# Patient Record
Sex: MALE | Race: OTHER | NOT HISPANIC OR LATINO | URBAN - METROPOLITAN AREA
[De-identification: names, ages, dates, MRNs, and addresses within clinical notes are randomized per-mention and may not be internally consistent; named-entity substitution may affect disease eponyms.]

---

## 2020-10-02 ENCOUNTER — EMERGENCY (EMERGENCY)
Facility: HOSPITAL | Age: 35
LOS: 1 days | Discharge: HOME | End: 2020-10-02
Attending: HOSPITALIST | Admitting: EMERGENCY MEDICINE
Payer: MEDICAID

## 2020-10-02 VITALS
OXYGEN SATURATION: 100 % | TEMPERATURE: 99 F | RESPIRATION RATE: 16 BRPM | DIASTOLIC BLOOD PRESSURE: 82 MMHG | HEART RATE: 78 BPM | SYSTOLIC BLOOD PRESSURE: 124 MMHG

## 2020-10-02 LAB
ALBUMIN SERPL ELPH-MCNC: 4.3 G/DL — SIGNIFICANT CHANGE UP (ref 3.3–5)
ALP SERPL-CCNC: 95 U/L — SIGNIFICANT CHANGE UP (ref 40–120)
ALT FLD-CCNC: 15 U/L — SIGNIFICANT CHANGE UP (ref 4–41)
ANION GAP SERPL CALC-SCNC: 12 MMO/L — SIGNIFICANT CHANGE UP (ref 7–14)
AST SERPL-CCNC: 21 U/L — SIGNIFICANT CHANGE UP (ref 4–40)
BASE EXCESS BLDV CALC-SCNC: 4 MMOL/L — SIGNIFICANT CHANGE UP
BASOPHILS # BLD AUTO: 0.02 K/UL — SIGNIFICANT CHANGE UP (ref 0–0.2)
BASOPHILS NFR BLD AUTO: 0.2 % — SIGNIFICANT CHANGE UP (ref 0–2)
BILIRUB SERPL-MCNC: 0.4 MG/DL — SIGNIFICANT CHANGE UP (ref 0.2–1.2)
BLOOD GAS VENOUS - CREATININE: 1.03 MG/DL — SIGNIFICANT CHANGE UP (ref 0.5–1.3)
BUN SERPL-MCNC: 11 MG/DL — SIGNIFICANT CHANGE UP (ref 7–23)
CALCIUM SERPL-MCNC: 9.3 MG/DL — SIGNIFICANT CHANGE UP (ref 8.4–10.5)
CHLORIDE BLDV-SCNC: 104 MMOL/L — SIGNIFICANT CHANGE UP (ref 96–108)
CHLORIDE SERPL-SCNC: 101 MMOL/L — SIGNIFICANT CHANGE UP (ref 98–107)
CO2 SERPL-SCNC: 26 MMOL/L — SIGNIFICANT CHANGE UP (ref 22–31)
CREAT SERPL-MCNC: 0.88 MG/DL — SIGNIFICANT CHANGE UP (ref 0.5–1.3)
EOSINOPHIL # BLD AUTO: 0.28 K/UL — SIGNIFICANT CHANGE UP (ref 0–0.5)
EOSINOPHIL NFR BLD AUTO: 2.7 % — SIGNIFICANT CHANGE UP (ref 0–6)
GAS PNL BLDV: 141 MMOL/L — SIGNIFICANT CHANGE UP (ref 136–146)
GLUCOSE BLDV-MCNC: 96 MG/DL — SIGNIFICANT CHANGE UP (ref 70–99)
GLUCOSE SERPL-MCNC: 96 MG/DL — SIGNIFICANT CHANGE UP (ref 70–99)
HCO3 BLDV-SCNC: 25 MMOL/L — SIGNIFICANT CHANGE UP (ref 20–27)
HCT VFR BLD CALC: 46.1 % — SIGNIFICANT CHANGE UP (ref 39–50)
HCT VFR BLDV CALC: 49.8 % — SIGNIFICANT CHANGE UP (ref 39–51)
HGB BLD-MCNC: 15.5 G/DL — SIGNIFICANT CHANGE UP (ref 13–17)
HGB BLDV-MCNC: 16.3 G/DL — SIGNIFICANT CHANGE UP (ref 13–17)
IMM GRANULOCYTES NFR BLD AUTO: 0.2 % — SIGNIFICANT CHANGE UP (ref 0–1.5)
LACTATE BLDV-MCNC: 0.8 MMOL/L — SIGNIFICANT CHANGE UP (ref 0.5–2)
LYMPHOCYTES # BLD AUTO: 2.14 K/UL — SIGNIFICANT CHANGE UP (ref 1–3.3)
LYMPHOCYTES # BLD AUTO: 20.6 % — SIGNIFICANT CHANGE UP (ref 13–44)
MCHC RBC-ENTMCNC: 28.6 PG — SIGNIFICANT CHANGE UP (ref 27–34)
MCHC RBC-ENTMCNC: 33.6 % — SIGNIFICANT CHANGE UP (ref 32–36)
MCV RBC AUTO: 85.1 FL — SIGNIFICANT CHANGE UP (ref 80–100)
MONOCYTES # BLD AUTO: 1.04 K/UL — HIGH (ref 0–0.9)
MONOCYTES NFR BLD AUTO: 10 % — SIGNIFICANT CHANGE UP (ref 2–14)
NEUTROPHILS # BLD AUTO: 6.89 K/UL — SIGNIFICANT CHANGE UP (ref 1.8–7.4)
NEUTROPHILS NFR BLD AUTO: 66.3 % — SIGNIFICANT CHANGE UP (ref 43–77)
NRBC # FLD: 0 K/UL — SIGNIFICANT CHANGE UP (ref 0–0)
PCO2 BLDV: 53 MMHG — HIGH (ref 41–51)
PH BLDV: 7.36 PH — SIGNIFICANT CHANGE UP (ref 7.32–7.43)
PLATELET # BLD AUTO: 225 K/UL — SIGNIFICANT CHANGE UP (ref 150–400)
PMV BLD: 11.1 FL — SIGNIFICANT CHANGE UP (ref 7–13)
PO2 BLDV: 25 MMHG — LOW (ref 35–40)
POTASSIUM BLDV-SCNC: 3.7 MMOL/L — SIGNIFICANT CHANGE UP (ref 3.4–4.5)
POTASSIUM SERPL-MCNC: 3.8 MMOL/L — SIGNIFICANT CHANGE UP (ref 3.5–5.3)
POTASSIUM SERPL-SCNC: 3.8 MMOL/L — SIGNIFICANT CHANGE UP (ref 3.5–5.3)
PROT SERPL-MCNC: 6.9 G/DL — SIGNIFICANT CHANGE UP (ref 6–8.3)
RBC # BLD: 5.42 M/UL — SIGNIFICANT CHANGE UP (ref 4.2–5.8)
RBC # FLD: 12.3 % — SIGNIFICANT CHANGE UP (ref 10.3–14.5)
SAO2 % BLDV: 41.7 % — LOW (ref 60–85)
SODIUM SERPL-SCNC: 139 MMOL/L — SIGNIFICANT CHANGE UP (ref 135–145)
WBC # BLD: 10.39 K/UL — SIGNIFICANT CHANGE UP (ref 3.8–10.5)
WBC # FLD AUTO: 10.39 K/UL — SIGNIFICANT CHANGE UP (ref 3.8–10.5)

## 2020-10-02 PROCEDURE — 99284 EMERGENCY DEPT VISIT MOD MDM: CPT

## 2020-10-02 RX ORDER — SODIUM CHLORIDE 9 MG/ML
2000 INJECTION INTRAMUSCULAR; INTRAVENOUS; SUBCUTANEOUS ONCE
Refills: 0 | Status: COMPLETED | OUTPATIENT
Start: 2020-10-02 | End: 2020-10-02

## 2020-10-02 RX ADMIN — SODIUM CHLORIDE 2000 MILLILITER(S): 9 INJECTION INTRAMUSCULAR; INTRAVENOUS; SUBCUTANEOUS at 21:38

## 2020-10-02 RX ADMIN — Medication 20 MILLIGRAM(S): at 23:48

## 2020-10-02 RX ADMIN — SODIUM CHLORIDE 2000 MILLILITER(S): 9 INJECTION INTRAMUSCULAR; INTRAVENOUS; SUBCUTANEOUS at 23:49

## 2020-10-02 NOTE — ED PROVIDER NOTE - PROGRESS NOTE DETAILS
TONIO: I was signed out this pt pending CT read of abd for abd pain. CT shows no intraacute pathology. Pt was reassessed and has minimal pain at this time 2/10 per pt. tolerating PO, hungry. possible infection but otherwise healthy pt. Rec to f/u with PMD and hydrate PO and pain meds PRN. Pt aware. Given a copy of all labs and imaging for his records.

## 2020-10-02 NOTE — ED PROVIDER NOTE - PHYSICAL EXAMINATION
CONSTITUTIONAL: alert and active in no apparent distress; thin habitus.  HEAD: head atraumatic; normal cephalic shape.  EYES: clear bilaterally; no conjunctivitis or scleral icterus.  NOSE: nasal mucosa clear; no nasal discharge or congestion.  OROPHARYNX: lips/mouth moist with normal mucosa.  CARDIAC: regular rate & rhythm; normal S1, S2; no murmurs, rubs or gallops.  RESPIRATORY: breath sounds clear to auscultation bilaterally; no distress present, no crackles, wheezes, rales, rhonchi, retractions, or tachypnea.  GASTROINTESTINAL: Abdomen mildly tender primarily periumbilical; non-distended; normoactive bowel sounds.  SKIN: skin warm, dry and intact; no evidence of rash.  NEURO: alert; interactive; no focal deficits.

## 2020-10-02 NOTE — ED ADULT NURSE NOTE - OBJECTIVE STATEMENT
pt amb to rm 15, A&Ox3, skin w/d/i, c/o abdm pain x 1 week associated w/ approx 12 episodes of diarrhea today occasionally w/ blood, denies blood thinner use, states usually abdm pain resolved w/ tums however did not resolve today, appears in NAD @ this time, MD Sales @ bedside for eval, SL placed, labs sent, report to G[facility] SYKES.  (fac)

## 2020-10-02 NOTE — ED PROVIDER NOTE - ATTENDING CONTRIBUTION TO CARE
35M with no PMhx p/w crampy abdominal pain and diarrhea for the past week. patient states he has been having numerous brown/watery loose stools per day with associated abdominal pain. today he tried drinking water and he ended up in the bathroom with more diarrhea. (+) anorexia. no fevers, travel, vomiting. did see 1 episode of blood today with most recent BM.     ***GEN - NAD; well appearing; A+O x3 ***HEAD - NC/AT ***EYES/NOSE - PERRL, EOMI, mucous membranes moist, no discharge ***THROAT: Oral cavity and pharynx normal. No inflammation, swelling, exudate, or lesions.  ***NECK: Neck supple, non-tender without lymphadenopathy, no masses, no thyromegaly.   ***PULMONARY - CTA b/l, symmetric breath sounds. ***CARDIAC -s1s2, RRR, no M,G,R  ***ABDOMEN - +BS, ND, NT, soft, no guarding, no rebound, no masses   ***BACK - no CVA tenderness, Normal  spine ***EXTREMITIES - symmetric pulses, 2+ dp, capillary refill < 2 seconds, no clubbing, no cyanosis, no edema ***SKIN - no rash or bruising   ***NEUROLOGIC - alert, CN 2-12 intact    MDM: 35M with diarrhea x1 week and abdominal pain. w/u for UC/Crohns, possible diarrhea predominant IBS. labs, fluids, ct a/p

## 2020-10-02 NOTE — ED PROVIDER NOTE - PATIENT PORTAL LINK FT
You can access the FollowMyHealth Patient Portal offered by [facility] by registering at the following website: [email or web address]. By joining [employer]’s FollowMyHealth portal, you will also be able to view your health information using other applications (apps) compatible with our system.

## 2020-10-02 NOTE — ED PROVIDER NOTE - NSFOLLOWUPINSTRUCTIONS_ED_ALL_ED_FT
Please follow up with your primary care doctor after you leave the emergency department so that they can follow up and conduct more testing and treatment as they deem necessary. If you have worsening signs or symptoms of what you came in to the Emergency Department today and are not able to see your doctor, go to your nearest emergency department or return to the [facility] emergency department for further care and management.   No signs of emergency medical condition on today's workup.  Presumptive diagnosis made, but further evaluation may be required by your primary care doctor or specialist for a definitive diagnosis.  Therefore, follow up as directed and if symptoms change/worsen or any emergency conditions, please return to the ER.

## 2020-10-02 NOTE — ED PROVIDER NOTE - OBJECTIVE STATEMENT
Pt is a 34 yo M with no PMH presenting with one week of abdominal pain and diarrhea w/ one episode of BRBPR. Pt states that he normally has GI pain at baseline when he eats that resolves with Tums, but for the past week, he has been having increased generalized crampy abdominal pain. He reports having 12 bowel movements today with one episode of BRBPR. Endorses intermittent nausea/vomiting and anorexia. Denies recent travel, abx/ppi use.

## 2020-10-02 NOTE — ED ADULT NURSE NOTE - NSIMPLEMENTINTERV_GEN_ALL_ED
Implemented All Universal Safety Interventions:  [place] to call system. Call bell, personal items and telephone within reach. Instruct patient to call for assistance. Room bathroom lighting operational. Non-slip footwear when patient is off stretcher. Physically safe environment: no spills, clutter or unnecessary equipment. Stretcher in lowest position, wheels locked, appropriate side rails in place.

## 2020-10-02 NOTE — ED PROVIDER NOTE - CLINICAL SUMMARY MEDICAL DECISION MAKING FREE TEXT BOX
Pt is a 36 yo M no PMH presenting with one week of abd pain, diarrhea, 1xBRBPR. Exam largely nonfocal. Will f/u labs and CT. DDx including UC/Crohn vs IBS. -Ja Zacarias, MS4 Pt is a 34 yo M no PMH presenting with one week of abd pain, diarrhea, 1xBRBPR. Exam largely nonfocal. Will f/u labs and CT. DDx including infectious vs UC/Crohn vs IBS. -Ja Zacarias, MS4

## 2020-10-03 VITALS — HEART RATE: 85 BPM | DIASTOLIC BLOOD PRESSURE: 76 MMHG | SYSTOLIC BLOOD PRESSURE: 120 MMHG

## 2020-10-03 PROCEDURE — 74177 CT ABD & PELVIS W/CONTRAST: CPT | Mod: 26

## 2020-10-03 RX ORDER — ACETAMINOPHEN 500 MG
650 TABLET ORAL ONCE
Refills: 0 | Status: COMPLETED | OUTPATIENT
Start: 2020-10-03 | End: 2020-10-03

## 2020-10-03 RX ADMIN — Medication 650 MILLIGRAM(S): at 01:37

## 2020-11-04 NOTE — ED ADULT NURSE NOTE - NS ED NURSE DISCH DISPOSITION
Alert and oriented to person, place, time/situation. normal mood and affect. no apparent risk to self or others.
Discharged

## 2024-01-04 NOTE — ED ADULT NURSE REASSESSMENT NOTE - NS ED NURSE REASSESS COMMENT FT1
Received report from erick RN: pt ambulatory at this time, appears in NAD, pending CT. Medicated for pain as ordered, will continue to monitor. no

## 2024-03-30 ENCOUNTER — EMERGENCY (EMERGENCY)
Facility: HOSPITAL | Age: 39
LOS: 1 days | Discharge: HOME | End: 2024-03-30
Attending: EMERGENCY MEDICINE | Admitting: EMERGENCY MEDICINE
Payer: MEDICAID

## 2024-03-30 VITALS
DIASTOLIC BLOOD PRESSURE: 63 MMHG | RESPIRATION RATE: 17 BRPM | HEART RATE: 74 BPM | OXYGEN SATURATION: 100 % | TEMPERATURE: 98 F | SYSTOLIC BLOOD PRESSURE: 104 MMHG

## 2024-03-30 VITALS
TEMPERATURE: 98 F | RESPIRATION RATE: 16 BRPM | DIASTOLIC BLOOD PRESSURE: 74 MMHG | OXYGEN SATURATION: 98 % | HEART RATE: 95 BPM | SYSTOLIC BLOOD PRESSURE: 122 MMHG

## 2024-03-30 LAB
ALBUMIN SERPL ELPH-MCNC: 4.8 G/DL — SIGNIFICANT CHANGE UP (ref 3.3–5)
ALP SERPL-CCNC: 102 U/L — SIGNIFICANT CHANGE UP (ref 40–120)
ALT FLD-CCNC: 27 U/L — SIGNIFICANT CHANGE UP (ref 4–41)
ANION GAP SERPL CALC-SCNC: 13 MMOL/L — SIGNIFICANT CHANGE UP (ref 7–14)
AST SERPL-CCNC: 26 U/L — SIGNIFICANT CHANGE UP (ref 4–40)
BASOPHILS # BLD AUTO: 0.03 K/UL — SIGNIFICANT CHANGE UP (ref 0–0.2)
BASOPHILS NFR BLD AUTO: 0.3 % — SIGNIFICANT CHANGE UP (ref 0–2)
BILIRUB SERPL-MCNC: 0.4 MG/DL — SIGNIFICANT CHANGE UP (ref 0.2–1.2)
BUN SERPL-MCNC: 8 MG/DL — SIGNIFICANT CHANGE UP (ref 7–23)
CALCIUM SERPL-MCNC: 9.6 MG/DL — SIGNIFICANT CHANGE UP (ref 8.4–10.5)
CHLORIDE SERPL-SCNC: 102 MMOL/L — SIGNIFICANT CHANGE UP (ref 98–107)
CO2 SERPL-SCNC: 27 MMOL/L — SIGNIFICANT CHANGE UP (ref 22–31)
CREAT SERPL-MCNC: 0.79 MG/DL — SIGNIFICANT CHANGE UP (ref 0.5–1.3)
EGFR: 116 ML/MIN/1.73M2 — SIGNIFICANT CHANGE UP
EOSINOPHIL # BLD AUTO: 0.18 K/UL — SIGNIFICANT CHANGE UP (ref 0–0.5)
EOSINOPHIL NFR BLD AUTO: 1.6 % — SIGNIFICANT CHANGE UP (ref 0–6)
GLUCOSE SERPL-MCNC: 84 MG/DL — SIGNIFICANT CHANGE UP (ref 70–99)
HCT VFR BLD CALC: 46.6 % — SIGNIFICANT CHANGE UP (ref 39–50)
HGB BLD-MCNC: 15.6 G/DL — SIGNIFICANT CHANGE UP (ref 13–17)
IANC: 8.16 K/UL — HIGH (ref 1.8–7.4)
IMM GRANULOCYTES NFR BLD AUTO: 0.3 % — SIGNIFICANT CHANGE UP (ref 0–0.9)
LYMPHOCYTES # BLD AUTO: 2.52 K/UL — SIGNIFICANT CHANGE UP (ref 1–3.3)
LYMPHOCYTES # BLD AUTO: 21.9 % — SIGNIFICANT CHANGE UP (ref 13–44)
MCHC RBC-ENTMCNC: 29.1 PG — SIGNIFICANT CHANGE UP (ref 27–34)
MCHC RBC-ENTMCNC: 33.5 GM/DL — SIGNIFICANT CHANGE UP (ref 32–36)
MCV RBC AUTO: 86.9 FL — SIGNIFICANT CHANGE UP (ref 80–100)
MONOCYTES # BLD AUTO: 0.61 K/UL — SIGNIFICANT CHANGE UP (ref 0–0.9)
MONOCYTES NFR BLD AUTO: 5.3 % — SIGNIFICANT CHANGE UP (ref 2–14)
NEUTROPHILS # BLD AUTO: 8.16 K/UL — HIGH (ref 1.8–7.4)
NEUTROPHILS NFR BLD AUTO: 70.6 % — SIGNIFICANT CHANGE UP (ref 43–77)
NRBC # BLD: 0 /100 WBCS — SIGNIFICANT CHANGE UP (ref 0–0)
NRBC # FLD: 0 K/UL — SIGNIFICANT CHANGE UP (ref 0–0)
PLATELET # BLD AUTO: 219 K/UL — SIGNIFICANT CHANGE UP (ref 150–400)
POTASSIUM SERPL-MCNC: 3.8 MMOL/L — SIGNIFICANT CHANGE UP (ref 3.5–5.3)
POTASSIUM SERPL-SCNC: 3.8 MMOL/L — SIGNIFICANT CHANGE UP (ref 3.5–5.3)
PROT SERPL-MCNC: 7.5 G/DL — SIGNIFICANT CHANGE UP (ref 6–8.3)
RBC # BLD: 5.36 M/UL — SIGNIFICANT CHANGE UP (ref 4.2–5.8)
RBC # FLD: 13.5 % — SIGNIFICANT CHANGE UP (ref 10.3–14.5)
SODIUM SERPL-SCNC: 142 MMOL/L — SIGNIFICANT CHANGE UP (ref 135–145)
TROPONIN T, HIGH SENSITIVITY RESULT: <6 NG/L — SIGNIFICANT CHANGE UP
WBC # BLD: 11.53 K/UL — HIGH (ref 3.8–10.5)
WBC # FLD AUTO: 11.53 K/UL — HIGH (ref 3.8–10.5)

## 2024-03-30 PROCEDURE — 71046 X-RAY EXAM CHEST 2 VIEWS: CPT | Mod: 26

## 2024-03-30 PROCEDURE — 71250 CT THORAX DX C-: CPT | Mod: 26,MC

## 2024-03-30 PROCEDURE — 99285 EMERGENCY DEPT VISIT HI MDM: CPT

## 2024-03-30 PROCEDURE — 93010 ELECTROCARDIOGRAM REPORT: CPT

## 2024-03-30 RX ORDER — MORPHINE SULFATE 50 MG/1
6 CAPSULE, EXTENDED RELEASE ORAL ONCE
Refills: 0 | Status: DISCONTINUED | OUTPATIENT
Start: 2024-03-30 | End: 2024-03-30

## 2024-03-30 RX ORDER — KETOROLAC TROMETHAMINE 30 MG/ML
15 SYRINGE (ML) INJECTION ONCE
Refills: 0 | Status: DISCONTINUED | OUTPATIENT
Start: 2024-03-30 | End: 2024-03-30

## 2024-03-30 RX ADMIN — MORPHINE SULFATE 6 MILLIGRAM(S): 50 CAPSULE, EXTENDED RELEASE ORAL at 18:48

## 2024-03-30 RX ADMIN — Medication 15 MILLIGRAM(S): at 18:48

## 2024-03-30 NOTE — ED ADULT NURSE NOTE - NSFALLUNIVINTERV_ED_ALL_ED
Bed/Stretcher in lowest position, wheels locked, appropriate side rails in place/Call bell, personal items and telephone in reach/Instruct patient to call for assistance before getting out of bed/chair/stretcher/Non-slip footwear applied when patient is off stretcher/[place] to call system/Physically safe environment - no spills, clutter or unnecessary equipment/Purposeful proactive rounding/Room/bathroom lighting operational, light cord in reach

## 2024-03-30 NOTE — ED PROVIDER NOTE - PHYSICAL EXAMINATION
GENERAL: NAD  HEENT:  Atraumatic  CHEST/LUNG: Chest rise equal bilaterally. Right chest wall TTP, clear lung sounds b/l  HEART: Regular rate and rhythm  ABDOMEN: Soft, Nontender, Nondistended  EXTREMITIES:  Extremities warm  PSYCH: A&Ox3  SKIN: No obvious rashes or lesions  MSK: No cervical spine TTP, able to range neck to the left and right  NEUROLOGY: strength and sensation intact in all extremities

## 2024-03-30 NOTE — ED PROVIDER NOTE - ATTENDING CONTRIBUTION TO CARE
38 year old with right sided chest pain for 1 week after moving nitrogen container. symptoms worsened today after playing with children. right chest is tender to palpation. equal breath sounds bilaterally. conern for rib fx vs sternal dislocation vs muscle tear. xray chest, ct chest if xray unrevealing. Will obtain cbc to rule out anemia. Will obtain CMP to rule out electrolyte abnormalities, liver failure or renal failure. will get trop and ekg to rule out ACS.

## 2024-03-30 NOTE — ED ADULT NURSE NOTE - OBJECTIVE STATEMENT
Pt received to rm 1   , awake and alert, A&OX4, ambulatory. C/o right sided CP. pt states he was moving a nitrogen tank and felt like a pull in his chest. pt denies any pertinent PMH. pt placed on cardiac monitor. NSR. cap re-fill <2 sec bilaterally . Respirations even and unlabored. Denies  SOB, N/V, HA, dizziness, palpitations, blurry vision. 20G IV placed to left AC Bed in lowest position, call bell within reach. Safety maintained.

## 2024-03-30 NOTE — ED ADULT NURSE REASSESSMENT NOTE - NS ED NURSE REASSESS COMMENT FT1
Pt received in Rm 1 in no acute distress. Denies headache, dizziness, SOB, abd pain. States feeling slight chest pain that is tolerable. Labs drawn. pending imaging results and lab results. Pending dispo planning.

## 2024-03-30 NOTE — ED PROVIDER NOTE - NSFOLLOWUPINSTRUCTIONS_ED_ALL_ED_FT
Please follow up with your primary care physician within 2-3 days.   Return to the ER for any new or concerning symptoms.   You may take 975 mg acetaminophen every 6 hours as needed for pain. You may take 600mg Ibuprofen (Advil) once every 8 hours as needed for pain. See medication label for warnings and use instructions.     Follow up with the [facility] within 48-72 hours: [phone].  Appointments are available Tuesdays from 2:00pm-8pm.        You were seen in the ER for chest pain we performed a CT scan which was normal and a chest x-ray which was normal.  Given the location of your pain we think it is likely that your pain is related to your muscles, you may have pulled your pectoralis.  We also performed lab work which evaluated your heart which was normal.  Please follow-up with your primary care doctor take medications for pain as needed.  You can apply heat and lidocaine patches to the area.  Rest until your pain is resolved.    Call 911 if:    You passed out (lost consciousness).  You have severe difficulty breathing.  You have symptoms of a heart attack. These may include:  Chest pain or pressure, or a strange feeling in your chest.  Sweating.  Shortness of breath.  Nausea or vomiting.  Pain, pressure, or a strange feeling in your back, neck, jaw, or upper belly or in one or both shoulders or arms.  Light-headedness or sudden weakness.  A fast or irregular heartbeat.

## 2024-03-30 NOTE — ED PROVIDER NOTE - OBJECTIVE STATEMENT
37 y/o male w/ PMH cervical disc herniation c/o 1 week history of Localized intermittent right-sided chest pain worse with palpation and movement.  Patient was moving a nitrogen tank when the pain began.  Denies trauma.  No sudden cardiac death in the family. Denies fevers, chills, nausea, vomiting, dizziness, SOB, abdominal pain, dysuria, hematuria.

## 2024-03-30 NOTE — ED PROVIDER NOTE - CLINICAL SUMMARY MEDICAL DECISION MAKING FREE TEXT BOX
37 y/o male w/ PMH cervical disc herniation c/o 1 week history of Localized intermittent right-sided chest pain worse with palpation and movement.  Patient was moving a nitrogen tank when the pain began.  Denies trauma.  No sudden cardiac death in the family. Denies fevers, chills, nausea, vomiting, dizziness, SOB, abdominal pain, dysuria, hematuria. Chest wall TTP. Concern for MSK strain vs rib fx. Low concern for ACS at this time given age, lack of cardiac comorbidities, and HPI. pain control, imaging and reassess.

## 2024-03-30 NOTE — ED ADULT TRIAGE NOTE - CHIEF COMPLAINT QUOTE
pt was moving nitrogin tank and thinks he hurts self  pt has had chest pain constantly ever since  no sob

## 2024-03-30 NOTE — ED PROVIDER NOTE - PATIENT PORTAL LINK FT
You can access the FollowMyHealth Patient Portal offered by [facility] by registering at the following website: [email or web address]. By joining [employer]’s FollowMyHealth portal, you will also be able to view your health information using other applications (apps) compatible with our system.

## 2024-03-30 NOTE — ED PROVIDER NOTE - PROGRESS NOTE DETAILS
Josue, PGY-3, EM: pt well appearing, pain controlled w/ medications. imaging normal. Discussed with pt results of work up, strict return precautions, and need for follow up.  Pt expressed understanding and agrees with plan.

## 2024-05-25 ENCOUNTER — EMERGENCY (EMERGENCY)
Facility: HOSPITAL | Age: 39
LOS: 1 days | Discharge: HOME | End: 2024-05-25
Admitting: EMERGENCY MEDICINE
Payer: OTHER MISCELLANEOUS

## 2024-05-25 VITALS
SYSTOLIC BLOOD PRESSURE: 124 MMHG | OXYGEN SATURATION: 99 % | TEMPERATURE: 98 F | DIASTOLIC BLOOD PRESSURE: 83 MMHG | HEIGHT: 68 IN | HEART RATE: 77 BPM | RESPIRATION RATE: 18 BRPM | WEIGHT: 119.93 LBS

## 2024-05-25 VITALS
RESPIRATION RATE: 18 BRPM | DIASTOLIC BLOOD PRESSURE: 71 MMHG | OXYGEN SATURATION: 99 % | TEMPERATURE: 98 F | SYSTOLIC BLOOD PRESSURE: 123 MMHG | HEART RATE: 87 BPM

## 2024-05-25 PROCEDURE — 99284 EMERGENCY DEPT VISIT MOD MDM: CPT

## 2024-05-25 RX ORDER — LIDOCAINE 4 G/100G
1 CREAM TOPICAL ONCE
Refills: 0 | Status: COMPLETED | OUTPATIENT
Start: 2024-05-25 | End: 2024-05-25

## 2024-05-25 RX ORDER — ACETAMINOPHEN 500 MG
975 TABLET ORAL ONCE
Refills: 0 | Status: COMPLETED | OUTPATIENT
Start: 2024-05-25 | End: 2024-05-25

## 2024-05-25 RX ORDER — DIAZEPAM 5 MG
5 TABLET ORAL ONCE
Refills: 0 | Status: DISCONTINUED | OUTPATIENT
Start: 2024-05-25 | End: 2024-05-25

## 2024-05-25 RX ORDER — DIAZEPAM 5 MG
1 TABLET ORAL
Qty: 6 | Refills: 0
Start: 2024-05-25 | End: 2024-05-26

## 2024-05-25 RX ORDER — IBUPROFEN 200 MG
600 TABLET ORAL ONCE
Refills: 0 | Status: COMPLETED | OUTPATIENT
Start: 2024-05-25 | End: 2024-05-25

## 2024-05-25 RX ADMIN — Medication 5 MILLIGRAM(S): at 17:53

## 2024-05-25 RX ADMIN — Medication 975 MILLIGRAM(S): at 17:52

## 2024-05-25 RX ADMIN — LIDOCAINE 1 PATCH: 4 CREAM TOPICAL at 17:53

## 2024-05-25 RX ADMIN — Medication 600 MILLIGRAM(S): at 17:53

## 2024-05-25 NOTE — ED PROVIDER NOTE - OBJECTIVE STATEMENT
38-year-old male with past medical history of cervical herniated disc 9 years ago that did not cause pain presents to ED complaining of neck pain status post heavy lifting 2 days ago.  Patient states he has had intermittent numbness of his fingertips bilaterally for a month without any pain.  States he started a new job where he performs heavy lifting daily.  States 2 days ago he was pulling a box when he developed acute onset of bilateral trapezius pain.  Worse with movement.  Does not have any numbness now.  No weakness tingling.  No chest pain shortness of breath palpitations.  Has not had surgery or had physical therapy before.

## 2024-05-25 NOTE — ED PROVIDER NOTE - PATIENT PORTAL LINK FT
You can access the FollowMyHealth Patient Portal offered by [facility] by registering at the following website: [email or web address]. By joining [employer]’s FollowMyHealth portal, you will also be able to view your health information using other applications (apps) compatible with our system.

## 2024-05-25 NOTE — ED ADULT NURSE NOTE - OBJECTIVE STATEMENT
Patient received in wellness, exam room 4. Patient A&Ox3 and ambulatory at baseline. Patient presents to the ED c/o bilateral upper back and shoulder pain x 1 week. phx herniated disc. Patient states for approximately one week, he has been experiencing pain to bilateral upper back and shoulder; patient denies recent trauma. No signs/symptoms of trauma noted to upper back and shoulders. 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 Patient denies headache, dizziness, lightheadedness, nausea/vomiting, fever/chills, and pain. Patient offers no complaints at this time. Vital signs stable, respirations even and unlabored, no signs/symptoms of acute distress; patient denies dyspnea, shortness of breath, and chest pain. Patient is stable at this time. Steady gait observed.

## 2024-05-25 NOTE — ED PROVIDER NOTE - PROGRESS NOTE DETAILS
Pt states pain improved although still 6/10. offered oxycodone however deffering. amenable for dc home and will f/u with spine outpt. given return precautions.
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
## 2024-05-25 NOTE — ED PROVIDER NOTE - NSFOLLOWUPINSTRUCTIONS_ED_ALL_ED_FT
Follow with your PMD within 48-72 hours.  Rest, no heavy lifting.  Warm compresses to area. Recommend Ortho consult to discuss possible MRI vs Physical Therapy- referral list provided.  Light walking. Take Motrin 600 mg every 6-8 hours for pain with food, Valium 5mg every 8 hours as needed for muscle spasm- caution drowsiness/do not drive/lift heavy machinery. You may also use Salonpas pain patches over the counter as needed for pain.    Take Tylenol 650mg (Two 325 mg pills) every 4-6 hours as needed for pain.       Any worsening pain, weakness, numbness, bowel or urinary incontinence or new concerning symptoms return to the Emergency Department.

## 2024-05-25 NOTE — ED PROVIDER NOTE - CLINICAL SUMMARY MEDICAL DECISION MAKING FREE TEXT BOX
38-year-old male with past medical history of cervical herniated disc 9 years ago that did not cause pain presents to ED complaining of neck pain status post heavy lifting 2 days ago.  Patient states he has had intermittent numbness of his fingertips bilaterally for a month without any pain.  States he started a new job where he performs heavy lifting daily.  States 2 days ago he was pulling a box when he developed acute onset of bilateral trapezius pain.  Worse with movement.  Does not have any numbness now.  No weakness tingling. on exam no neuro deficits no midline spinal ttp. b/l trapezius muscle ttp. concern for muscular injury, possible herniated disc. plan to treat with muscle relaxant, nsaids, tylenol. pt not driving home. discussed outpt follow up with spine for PT, MRI. given return precautions.

## 2024-05-26 PROBLEM — Z00.00 ENCOUNTER FOR PREVENTIVE HEALTH EXAMINATION: Status: ACTIVE | Noted: 2024-05-26

## 2024-12-29 ENCOUNTER — EMERGENCY (EMERGENCY)
Facility: HOSPITAL | Age: 39
LOS: 1 days | Discharge: HOME | End: 2024-12-29
Attending: EMERGENCY MEDICINE | Admitting: EMERGENCY MEDICINE
Payer: SELF-PAY

## 2024-12-29 VITALS
HEART RATE: 115 BPM | RESPIRATION RATE: 18 BRPM | DIASTOLIC BLOOD PRESSURE: 73 MMHG | WEIGHT: 110.01 LBS | OXYGEN SATURATION: 99 % | TEMPERATURE: 98 F | SYSTOLIC BLOOD PRESSURE: 128 MMHG

## 2024-12-29 LAB
FLUAV AG NPH QL: DETECTED
FLUBV AG NPH QL: SIGNIFICANT CHANGE UP
RSV RNA NPH QL NAA+NON-PROBE: SIGNIFICANT CHANGE UP
SARS-COV-2 RNA SPEC QL NAA+PROBE: SIGNIFICANT CHANGE UP

## 2024-12-29 PROCEDURE — 99284 EMERGENCY DEPT VISIT MOD MDM: CPT

## 2024-12-29 RX ORDER — ACETAMINOPHEN 500MG 500 MG/1
650 TABLET, COATED ORAL ONCE
Refills: 0 | Status: COMPLETED | OUTPATIENT
Start: 2024-12-29 | End: 2024-12-29

## 2024-12-29 RX ORDER — LIDOCAINE 40 MG/G
1 CREAM TOPICAL ONCE
Refills: 0 | Status: COMPLETED | OUTPATIENT
Start: 2024-12-29 | End: 2024-12-29

## 2024-12-29 RX ORDER — IBUPROFEN 200 MG
400 TABLET ORAL ONCE
Refills: 0 | Status: COMPLETED | OUTPATIENT
Start: 2024-12-29 | End: 2024-12-29

## 2024-12-29 RX ADMIN — ACETAMINOPHEN 500MG 650 MILLIGRAM(S): 500 TABLET, COATED ORAL at 21:39

## 2024-12-29 RX ADMIN — LIDOCAINE 1 PATCH: 40 CREAM TOPICAL at 21:39

## 2024-12-29 RX ADMIN — Medication 400 MILLIGRAM(S): at 21:38

## 2024-12-29 NOTE — ED PROVIDER NOTE - PHYSICAL EXAMINATION
Patient here today for EKG test.    
General: alert, oriented to person, time, place. crying.   Psych: mood appropriate  Head: normocephalic; atraumatic  Eyes: EOMI, conjunctivae clear bilaterally, sclerae anicteric  ENT: no nasal flaring, patent nares  Cardio: RRR, no m/r/g, pulses 2+ b/l  Resp: CATB, no w/r/r  GI: soft/nondistended/nontender  Neuro: normal sensation, moving all four extremities equally  Skin: No evidence of rash or bruising  MSK: normal movement of all extremities, pulses 2+ bl, sensation intact bl, tenderness to light palpation entire hand, arm, shoulder. No tenderness to palpation chest wall.   Lymph/Vasc: no LE edema

## 2024-12-29 NOTE — ED PROVIDER NOTE - PATIENT PORTAL LINK FT
You can access the FollowMyHealth Patient Portal offered by [facility] by registering at the following website: [email or web address]. By joining [employer]’s FollowMyHealth portal, you will also be able to view your health information using other applications (apps) compatible with our system.

## 2024-12-29 NOTE — ED PROVIDER NOTE - CLINICAL SUMMARY MEDICAL DECISION MAKING FREE TEXT BOX
38 yo male history disc herniation presenting with L sided neck/shoulder/arm/hand pain after fall on ice 3 days ago. Exam as above. Will get xrays and treat with tylenol, motrin, lidocaine patch. Also endorsing 2 d history of fever, cough, congestion, nausea, vomiting. No fever in ED. Will get rvp and chest x-ray. Will reassess and give additional intervention as indicated. Ariel att: 38 yo male history disc herniation presenting with L sided neck/shoulder/arm/hand pain after fall on ice 3 days ago. Exam as above. Will get xrays and treat with tylenol, motrin, lidocaine patch. Also endorsing 2 d history of fever, cough, congestion, nausea, vomiting. No fever in ED. Will get rvp and chest x-ray. Will reassess and give additional intervention as indicated.

## 2024-12-29 NOTE — ED PROVIDER NOTE - NSFOLLOWUPINSTRUCTIONS_ED_ALL_ED_FT
Contusion in Adults  WHAT YOU NEED TO KNOW:  A contusion is a bruise that appears on your skin after an injury. A bruise happens when small blood vessels tear but skin does not. Blood leaks into nearby tissue, such as soft tissue or muscle.  DISCHARGE INSTRUCTIONS:  Seek care immediately if:   •You have new trouble moving the injured area.  •You have tingling or numbness in or near the injured area.  •Your hand or foot below the bruise gets cold or turns pale.  Call your doctor if:   •You find a new lump in the injured area.  •Your symptoms do not improve with treatment after 4 to 5 days.  •You have questions or concerns about your condition or care.  Medicines: You may need any of the following:   •NSAIDs, such as ibuprofen, help decrease swelling, pain, and fever. This medicine is available with or without a doctor's order. NSAIDs can cause stomach bleeding or kidney problems in certain people. If you take blood thinner medicine, always ask your healthcare provider if NSAIDs are safe for you. Always read the medicine label and follow directions.  •Prescription pain medicine may be given. Ask your healthcare provider how to take this medicine safely. Some prescription pain medicines contain acetaminophen. Do not take other medicines that contain acetaminophen without talking to your healthcare provider. Too much acetaminophen may cause liver damage. Prescription pain medicine may cause constipation. Ask your healthcare provider how to prevent or treat constipation.   •Take your medicine as directed. Contact your healthcare provider if you think your medicine is not helping or if you have side effects. Tell him or her if you are allergic to any medicine. Keep a list of the medicines, vitamins, and herbs you take. Include the amounts, and when and why you take them. Bring the list or the pill bottles to follow-up visits. Carry your medicine list with you in case of an emergency.  Help a contusion heal:   •Rest the injured area or use it less than usual. If you bruised your leg or foot, you may need crutches or a cane to help you walk. This will help you keep weight off your injured body part.  •Apply ice to decrease swelling and pain. Ice may also help prevent tissue damage. Use an ice pack, or put crushed ice in a plastic bag. Cover it with a towel and place it on your bruise for 15 to 20 minutes every hour or as directed.  •Use compression to support the area and decrease swelling. Wrap an elastic bandage around the area over the bruised muscle. Make sure the bandage is not too tight. You should be able to fit 1 finger between the bandage and your skin.  •Elevate (raise) your injured body part above the level of your heart to help decrease pain and swelling. Use pillows, blankets, or rolled towels to elevate the area as often as you can.  •Do not drink alcohol as directed. Alcohol may slow healing.  •Do not stretch injured muscles right after your injury. Ask your healthcare provider when and how you may safely stretch after your injury. Gentle stretches can help increase your flexibility.•Do not massage the area or put heating pads on the bruise right after your injury. Heat and massage may slow healing. Your healthcare provider may tell you to apply heat after several days. At that time, heat will start to help the injury heal.  Prevent another contusion:   •Stretch and warm up before you play sports or exercise.  •Wear protective gear when you play sports. Examples are shin guards and padding.   •If you begin a new physical activity, start slowly to give your body a chance to adjust.  Follow up with your doctor as directed: Write down your questions so you remember to ask them during your visits.  Contusión en adultos  LO QUE NECESITA SABER:  Claudia contusión es un moretón que aparece en la piel después de claudia lesión. Un moretón se forma cuando se rompen los vasos sanguíneos pequeños, lilian no se rompe la piel. La vicki se filtra a los tejidos cercanos, jama los tejidos blandos o los músculos.  INSTRUCCIONES SOBRE EL BIPIN HOSPITALARIA:  Busque atención médica de inmediato si:  •Le surge dificultad para  el área lesionada.  •Usted tiene hormigueo o entumecimiento en el área lesionada o cerca de ésta.  •El área de keita mano o pie que se encuentra debajo del moretón se pone fría o pálida.  Llame a keita médico si:  •Usted encuentra un bulto nuevo en el área lesionada.  •Sobia síntomas no mejoran después de 4 o 5 días de tratamiento.  •Usted tiene preguntas o inquietudes acerca de keita condición o cuidado.  Medicamentos:Es posible que usted necesite alguno de los siguientes:   •Los TODD,jama el ibuprofeno, ayudan a disminuir la inflamación, el dolor y la fiebre. Torrie medicamento está disponible con o sin claudia receta médica. Los TODD pueden causar sangrado estomacal o problemas renales en ciertas personas. Si usted kilo un medicamento anticoagulante, siempre pregúntele a keita médico si los TODD son seguros para usted. Siempre ida la etiqueta de torrie medicamento y siga las instrucciones.  •Puede administrarsepodrían administrarse. Pregunte al médico cómo debe mackenzie torrie medicamento de forma don. Algunos medicamentos recetados para el dolor contienen acetaminofén. No tome otros medicamentos que contengan acetaminofén sin consultarlo con keita médico. Demasiado acetaminofeno puede causar daño al hígado. Los medicamentos recetados para el dolor podrían causar estreñimiento. Pregunte a keita médico jama prevenir o tratar estreñimiento.  •[place] sobia medicamentos jama se le haya indicado.Consulte con keita médico si usted sandip que keita medicamento no le está ayudando o si presenta efectos secundarios. Infórmele si es alérgico a cualquier medicamento. Mantenga claudia lista actualizada de los medicamentos, las vitaminas y los productos herbales que kilo. Incluya los siguientes datos de los medicamentos: cantidad, frecuencia y motivo de administración. Traiga con usted la lista o los envases de las píldoras a sobia citas de seguimiento. Lleve la lista de los medicamentos con usted en quirino de claudia emergencia.  Ayude a que keita contusión sane:  •Repose el área lesionadao úsela menos que de costumbre. Si a usted le salieron moretones en keita pierna o pie, es posible que deba usar muletas o un bastón para caminar. [place] le ayudará a no apoyarse en la parte lesionada del cuerpo.  •Aplique hielopara bajar la inflamación y calmar el dolor. El hielo también puede contribuir a evitar el daño de los tejidos. Use claudia compresa de hielo o ponga hielo triturado en claudia bolsa de plástico. Cubra el hielo con claudia toalla y colóquelo sobre el moretón bozena 15 a 20 minutos cada hora o según le indiquen.  •Use compresiónpara apoyar el área y disminuir la hinchazón. Coloque un vendaje elástico alrededor de la alicia sobre el músculo lesionado. Asegúrese de que el vendaje no quede demasiado apretado. Se debería poder meter 1 dedo entre el vendaje y la piel.  •Eleve la parte lesionada de keita cuerpopor encima del nivel de keita corazón para ayudar a aliviar el dolor y la inflamación. Use almohadas, cobijas o toallas enrolladas para elevar el área tan a menudo jama sea posible.  •No consuma alcoholsegún las indicaciones. El alcohol puede retardar la curación.  •No estire los músculos lesionadosinmediatamente después de la lesión. Pregunte a keita médico cuándo y cómo se puede estirar con precaución después de keita lesión. Los estiramientos suaves pueden ayudar a aumentar la flexibilidad.  •No masajee el área ni utilice almohadillas térmicasen la contusión inmediatamente después de la lesión. El calor y los masajes podrían retrasar la sanación. Keita médico podría indicarle que aplique calor después de varios días. En rizwana momento, el calor comenzará a servir para sanar la lesión.  Evite otra contusión:  •Estírese y entre en calor antes de practicar deportes o hacer ejercicio.  •Use equipo de protección cuando practique deportes. Algunos ejemplos son las espinilleras y las prendas acolchadas.  •Si comienza a hacer claudia nueva actividad física, empiece poco a poco para darle tiempo al cuerpo de acostumbrarse.  Acuda a la consulta de control con keita médico según las indicaciones:Anote sobia preguntas para que se acuerde de hacerlas bozena sobia visitas.        Influenza, Adult    Influenza, more commonly known as "the flu," is a viral infection that mainly affects the respiratory tract. The respiratory tract includes organs that help you breathe, such as the lungs, nose, and throat. The flu causes many symptoms similar to the common cold along with high fever and body aches.    The flu spreads easily from person to person (iscontagious). Getting a flu shot (influenza vaccination) every year is the best way to prevent the flu.    What are the causes?  This condition is caused by the influenza virus. You can get the virus by:    Breathing in droplets that are in the air from an infected person's cough or sneeze.  Touching something that has been exposed to the virus (has been contaminated) and then touching your mouth, nose, or eyes.    What increases the risk?  The following factors may make you more likely to get the flu:    Not washing or sanitizing your hands often.  Having close contact with many people during cold and flu season.  Touching your mouth, eyes, or nose without first washing or sanitizing your hands.  Not getting a yearly (annual) flu shot.    You may have a higher risk for the flu, including serious problems such as a lung infection (pneumonia), if you:    Are older than 65.  Are pregnant.  Have a weakened disease-fighting system (immune system). You may have a weakened immune system if you:    Have HIV or AIDS.  Are undergoing chemotherapy.  Are taking medicines that reduce (suppress) the activity of your immune system.  Have a long-term (chronic) illness, such as heart disease, kidney disease, diabetes, or lung disease.  Have a liver disorder.  Are severely overweight (morbidly obese).  Have anemia. This is a condition that affects your red blood cells.  Have asthma.    What are the signs or symptoms?  Symptoms of this condition usually begin suddenly and last 4–14 days. They may include:    Fever and chills.  Headaches, body aches, or muscle aches.  Sore throat.  Cough.  Runny or stuffy (congested) nose.  Chest discomfort.  Poor appetite.  Weakness or fatigue.  Dizziness.  Nausea or vomiting.    How is this diagnosed?  This condition may be diagnosed based on:    Your symptoms and medical history.  A physical exam.   Swabbing your nose or throat and testing the fluid for the influenza virus.    How is this treated?  If the flu is diagnosed early, you can be treated with medicine that can help reduce how severe the illness is and how long it lasts (antiviral medicine). This may be given by mouth (orally) or through an IV.    Taking care of yourself at home can help relieve symptoms. Your health care provider may recommend:    Taking over-the-counter medicines.  Drinking plenty of fluids.    In many cases, the flu goes away on its own. If you have severe symptoms or complications, you may be treated in a hospital.    Follow these instructions at home:      Activity    Rest as needed and get plenty of sleep.  Stay home from work or school as told by your health care provider. Unless you are visiting your health care provider, avoid leaving home until your fever has been gone for 24 hours without taking medicine.        Eating and drinking    Take an oral rehydration solution (ORS). This is a drink that is sold at pharmacies and retail stores.  Drink enough fluid to keep your urine pale yellow.  Drink clear fluids in small amounts as you are able. Clear fluids include water, ice chips, diluted fruit juice, and low-calorie sports drinks.  Eat bland, easy-to-digest foods in small amounts as you are able. These foods include bananas, applesauce, rice, lean meats, toast, and crackers.  Avoid drinking fluids that contain a lot of sugar or caffeine, such as energy drinks, regular sports drinks, and soda.  Avoid alcohol.  Avoid spicy or fatty foods.        General instructions      Take over-the-counter and prescription medicines only as told by your health care provider.  Use a cool mist humidifier to add humidity to the air in your home. This can make it easier to breathe.  Cover your mouth and nose when you cough or sneeze.  Wash your hands with soap and water often, especially after you cough or sneeze. If soap and water are not available, use alcohol-based hand .  Keep all follow-up visits as told by your health care provider. This is important.    How is this prevented?     Get an annual flu shot. You may get the flu shot in late summer, fall, or winter. Ask your health care provider when you should get your flu shot.  Avoid contact with people who are sick during cold and flu season. This is generally fall and winter.    Contact a health care provider if:  You develop new symptoms.  You have:    Chest pain.  Diarrhea.  A fever.  Your cough gets worse.  You produce more mucus.  You feel nauseous or you vomit.    Get help right away if:  You develop shortness of breath or difficulty breathing.  Your skin or nails turn a bluish color.  You have severe pain or stiffness in your neck.  You develop a sudden headache or sudden pain in your face or ear.  You cannot eat or drink without vomiting.    Summary  Influenza, more commonly known as "the flu," is a viral infection that primarily affects your respiratory tract.  Symptoms of the flu usually begin suddenly and last 4–14 days.  Getting an annual flu shot is the best way to prevent getting the flu.  Stay home from work or school as told by your health care provider. Unless you are visiting your health care provider, avoid leaving home until your fever has been gone for 24 hours without taking medicine.  Keep all follow-up visits as told by your health care provider. This is important.    ADDITIONAL NOTES AND INSTRUCTIONS    Please follow up with your Primary MD in 24-48 hr.  Seek immediate medical care for any new/worsening signs or symptoms.

## 2024-12-29 NOTE — ED ADULT NURSE NOTE - OBJECTIVE STATEMENT
Pt received, alert and oriented x4, able to move all extremities and follow commands. C/O left shoulder and arm pain after fall. Able to perform limited ROM due to pain. Denies head strike. Also reports flu like symptoms, cough, nausea, vomiting, and diarrhea. Denies any pmh. Breathing is equal and unlabored on room air. NAD. Pending xray and dispo.

## 2024-12-29 NOTE — ED ADULT TRIAGE NOTE - CHIEF COMPLAINT QUOTE
pt c/o pain to entire left side x 3 days s/p slip and fall on ice.  pt states he is having difficulty breathing.  Hx:  denies

## 2024-12-29 NOTE — ED ADULT NURSE NOTE - NSFALLUNIVINTERV_ED_ALL_ED
Bed/Stretcher in lowest position, wheels locked, appropriate side rails in place/Call bell, personal items and telephone in reach/Instruct patient to call for assistance before getting out of bed/chair/stretcher/Non-slip footwear applied when patient is off stretcher/[place] to call system/Physically safe environment - no spills, clutter or unnecessary equipment/Purposeful proactive rounding/Room/bathroom lighting operational, light cord in reach

## 2024-12-29 NOTE — ED PROVIDER NOTE - OBJECTIVE STATEMENT
38 yo male history disc herniation presenting with L sided neck/shoulder/arm/hand pain after fall on ice 3 days ago. Patient states pain is throughout arm and in his veins. Reports difficulty moving arm and hand but that has been improving. Also reports swelling that has also resolved. Has tried tylenol, icy hot patches without relief.  complaining of difficulty breathing since fall. Also endorsing 2 d history of fever, cough, congestion, nausea, vomiting.

## 2024-12-30 VITALS
OXYGEN SATURATION: 100 % | SYSTOLIC BLOOD PRESSURE: 112 MMHG | HEART RATE: 86 BPM | DIASTOLIC BLOOD PRESSURE: 76 MMHG | RESPIRATION RATE: 17 BRPM | TEMPERATURE: 99 F

## 2024-12-30 PROBLEM — M50.20 OTHER CERVICAL DISC DISPLACEMENT, UNSPECIFIED CERVICAL REGION: Chronic | Status: ACTIVE | Noted: 2024-05-25

## 2024-12-30 PROCEDURE — 73110 X-RAY EXAM OF WRIST: CPT | Mod: 26,LT

## 2024-12-30 PROCEDURE — 73060 X-RAY EXAM OF HUMERUS: CPT | Mod: 26,LT

## 2024-12-30 PROCEDURE — 73090 X-RAY EXAM OF FOREARM: CPT | Mod: 26,LT

## 2024-12-30 PROCEDURE — 71045 X-RAY EXAM CHEST 1 VIEW: CPT | Mod: 26

## 2024-12-30 PROCEDURE — 73030 X-RAY EXAM OF SHOULDER: CPT | Mod: 26,LT

## 2024-12-30 RX ORDER — IBUPROFEN 200 MG
1 TABLET ORAL
Qty: 12 | Refills: 0
Start: 2024-12-30 | End: 2025-01-02

## 2024-12-30 NOTE — ED POST DISCHARGE NOTE - RESULT SUMMARY
peer jammie : Very faint osseous densities the inferior margin of the glenoid rim. May reflect avulsion type injuries possible=y related to a shoulder dislocation. Please correlate with patient history and physical exam findings. message left with Call Back  P.A. number and hours for return call back.